# Patient Record
Sex: MALE | Race: OTHER | HISPANIC OR LATINO | Employment: PART TIME | URBAN - METROPOLITAN AREA
[De-identification: names, ages, dates, MRNs, and addresses within clinical notes are randomized per-mention and may not be internally consistent; named-entity substitution may affect disease eponyms.]

---

## 2019-12-30 ENCOUNTER — APPOINTMENT (OUTPATIENT)
Dept: LAB | Age: 34
End: 2019-12-30

## 2019-12-30 ENCOUNTER — TRANSCRIBE ORDERS (OUTPATIENT)
Dept: URGENT CARE | Facility: MEDICAL CENTER | Age: 34
End: 2019-12-30

## 2019-12-30 ENCOUNTER — APPOINTMENT (OUTPATIENT)
Dept: URGENT CARE | Age: 34
End: 2019-12-30

## 2019-12-30 DIAGNOSIS — Z02.1 PRE-EMPLOYMENT EXAMINATION: Primary | ICD-10-CM

## 2019-12-30 DIAGNOSIS — Z02.1 PRE-EMPLOYMENT EXAMINATION: ICD-10-CM

## 2019-12-30 LAB — RUBV IGG SERPL IA-ACNC: 137.8 IU/ML

## 2019-12-30 PROCEDURE — 86765 RUBEOLA ANTIBODY: CPT

## 2019-12-30 PROCEDURE — 86735 MUMPS ANTIBODY: CPT

## 2019-12-30 PROCEDURE — 36415 COLL VENOUS BLD VENIPUNCTURE: CPT

## 2019-12-30 PROCEDURE — 86480 TB TEST CELL IMMUN MEASURE: CPT

## 2019-12-30 PROCEDURE — 86762 RUBELLA ANTIBODY: CPT

## 2019-12-31 LAB
GAMMA INTERFERON BACKGROUND BLD IA-ACNC: 0.04 IU/ML
M TB IFN-G BLD-IMP: NEGATIVE
M TB IFN-G CD4+ BCKGRND COR BLD-ACNC: 0 IU/ML
M TB IFN-G CD4+ BCKGRND COR BLD-ACNC: 0.01 IU/ML
MEV IGG SER QL: NORMAL
MITOGEN IGNF BCKGRD COR BLD-ACNC: >10 IU/ML
MUV IGG SER QL: NORMAL

## 2020-10-22 PROBLEM — L29.3: Status: ACTIVE | Noted: 2020-10-22

## 2020-12-22 ENCOUNTER — IMMUNIZATIONS (OUTPATIENT)
Dept: FAMILY MEDICINE CLINIC | Facility: HOSPITAL | Age: 35
End: 2020-12-22

## 2020-12-22 DIAGNOSIS — Z23 ENCOUNTER FOR IMMUNIZATION: ICD-10-CM

## 2020-12-22 PROCEDURE — 0001A SARS-COV-2 / COVID-19 MRNA VACCINE (PFIZER-BIONTECH) 30 MCG: CPT

## 2020-12-22 PROCEDURE — 91300 SARS-COV-2 / COVID-19 MRNA VACCINE (PFIZER-BIONTECH) 30 MCG: CPT

## 2021-01-11 ENCOUNTER — IMMUNIZATIONS (OUTPATIENT)
Dept: FAMILY MEDICINE CLINIC | Facility: HOSPITAL | Age: 36
End: 2021-01-11

## 2021-01-11 DIAGNOSIS — Z23 ENCOUNTER FOR IMMUNIZATION: ICD-10-CM

## 2021-01-11 PROCEDURE — 0002A SARS-COV-2 / COVID-19 MRNA VACCINE (PFIZER-BIONTECH) 30 MCG: CPT

## 2021-01-11 PROCEDURE — 91300 SARS-COV-2 / COVID-19 MRNA VACCINE (PFIZER-BIONTECH) 30 MCG: CPT

## 2021-09-14 ENCOUNTER — TELEPHONE (OUTPATIENT)
Dept: PSYCHIATRY | Facility: CLINIC | Age: 36
End: 2021-09-14

## 2021-09-14 NOTE — TELEPHONE ENCOUNTER
Behavorial Health Outpatient Intake Questions    Referred by: Internal Referral    Please advised interviewee that they need to answer all questions truthfully to allow for best care and any misrepresentations of information may affect their ability to be seen at this clinic   => Was this discussed? Yes     Behavorial Health Outpatient Intake History -     Presenting Problem (in patient's words): Anxiety and Panic Attacks     Are there any developmental disabilities? ? If yes, can they speak to you on the phone? If they are too limited to speak to you on phone, refer out No    Are you taking any psychiatric medications? No    => If yes, who prescribes? If yes, are they injectable medications? Does the patient have a language barrier or hearing impairment? No    Have you been treated at [facility] by a therapist or a doctor in the past? If yes, who? No    Has the patient been hospitalized for mental health? No   If yes, how long ago was last hospitalization and where was it? Do you actively use alcohol or marijuana or illegal substances? If yes, what and how much - refer out to Drug and alcohol treatment if use is excessive or daily use of illegal substances No concerns of substance abuse are reported  Do you have a community treatment team or ? No    Legal History-     Does the patient have any history of arrests, MCC/snf time, or DUIs? No  If Yes-  1) What types of charges? 2) When were they last incarcerated? 3) Are they currently on parole or probation? Minor Child-    Who has custody of the child? Is there a custody agreement? If there is a custody agreement remind parent that they must bring a copy to the first appt or they will not be seen       Intake Team, please check with provider before scheduling if flags come up such as:  - complex case  - legal history (other than DUI)  - communication barrier concerns are present  - if, in your judgment, this needs further review    ACCEPTED as a patient Yes  => Appointment Date: 9/17 at 3:30 with Dr SCHULTZ    Referred Elsewhere? No    Name of Insurance Co: [employer] ID# [id number]  [facility]  If ins is primary or secondary  If patient is a minor, parents information such as Name, D  O B of guarantor

## 2021-09-15 ENCOUNTER — APPOINTMENT (OUTPATIENT)
Dept: LAB | Facility: HOSPITAL | Age: 36
End: 2021-09-15

## 2021-09-15 DIAGNOSIS — Z00.8 ENCOUNTER FOR OTHER GENERAL EXAMINATION: ICD-10-CM

## 2021-09-15 LAB
CHOLEST SERPL-MCNC: 154 MG/DL
EST. AVERAGE GLUCOSE BLD GHB EST-MCNC: 88 MG/DL
HBA1C MFR BLD: 4.7 %
HDLC SERPL-MCNC: 49 MG/DL
LDLC SERPL CALC-MCNC: 92 MG/DL
NONHDLC SERPL-MCNC: 105 MG/DL
TRIGL SERPL-MCNC: 65 MG/DL

## 2021-09-15 PROCEDURE — 83036 HEMOGLOBIN GLYCOSYLATED A1C: CPT

## 2021-09-15 PROCEDURE — 84443 ASSAY THYROID STIM HORMONE: CPT | Performed by: PSYCHIATRY & NEUROLOGY

## 2021-09-15 PROCEDURE — 80061 LIPID PANEL: CPT

## 2021-09-15 PROCEDURE — 36415 COLL VENOUS BLD VENIPUNCTURE: CPT

## 2021-09-17 ENCOUNTER — OFFICE VISIT (OUTPATIENT)
Dept: PSYCHIATRY | Facility: CLINIC | Age: 36
End: 2021-09-17
Payer: COMMERCIAL

## 2021-09-17 DIAGNOSIS — F40.01 PANIC DISORDER WITH AGORAPHOBIA AND MODERATE PANIC ATTACKS: Primary | ICD-10-CM

## 2021-09-17 DIAGNOSIS — F41.9 ANXIETY: ICD-10-CM

## 2021-09-17 LAB — TSH SERPL DL<=0.05 MIU/L-ACNC: 1.6 UIU/ML (ref 0.47–4.68)

## 2021-09-17 PROCEDURE — 90792 PSYCH DIAG EVAL W/MED SRVCS: CPT | Performed by: PSYCHIATRY & NEUROLOGY

## 2021-09-17 RX ORDER — SERTRALINE HYDROCHLORIDE 25 MG/1
25 TABLET, FILM COATED ORAL DAILY
Qty: 30 TABLET | Refills: 0 | Status: SHIPPED | OUTPATIENT
Start: 2021-09-17 | End: 2021-10-08

## 2021-09-17 RX ORDER — HYDROXYZINE HYDROCHLORIDE 10 MG/1
10 TABLET, FILM COATED ORAL 2 TIMES DAILY PRN
Qty: 30 TABLET | Refills: 0 | Status: SHIPPED | OUTPATIENT
Start: 2021-09-17 | End: 2021-10-08 | Stop reason: SINTOL

## 2021-09-18 NOTE — BH TREATMENT PLAN
Treatment Plan done but not signed at time of office visit due to:  Plan reviewed by phone or in person  and verbal consent given due to [address] distancing  TREATMENT PLAN (Medication Management Only)        [facility]    Name and Date of Birth:  Sherman Bradley 39 y o  1985  Date of Treatment Plan: September 17, 2021  Diagnosis/Diagnoses:    1  Panic disorder with agoraphobia and moderate panic attacks    2  Anxiety      Strengths/Personal Resources for Self-Care: supportive family, supportive friends, ability to adapt to life changes, ability to communicate needs, ability to communicate well, ability to listen, ability to reason, average or above intelligence  Area/Areas of need (in own words): anxiety symptoms  1  Long Term Goal: alleviate anxiety, continue improvement in acceptable anxiety level  Target Date: 6 months - 3/17/2022  Person/Persons responsible for completion of goal:  self  2  Short Term Objective (s) - How will we reach this goal?:   A  Provider new recommended medication/dosage changes and/or continue medication(s): continue current medications as prescribed  B   N/A  Target Date: 6 months - 3/17/2022  Person/Persons Responsible for Completion of Goal:  self  Progress Towards Goals: starting treatment  Treatment Modality: medication management every 1 month  Review due 6 months from date of this plan: 6 months - 3/17/2022  Expected length of service: ongoing treatment unless revised  My Physician/PA/NP and I have developed this plan together and I agree to work on the goals and objectives  I understand the treatment goals that were developed for my treatment

## 2021-09-18 NOTE — PSYCH
Psychiatric Evaluation - Behavioral Health     Identification Data:[place] 39 y o  male MRN: [id number]  Unit/Bed#:  Encounter: [id number]    Chief Complaint:  The panic disorder that lead to significant disruption patient's life  History of Present Illness     Glo Stringer is a 39 y o  male with a history of anxiety and Panic Disorder who  was evaluated for medication management and therapy to address his severe recurrent panic disorder that started this February and after a brief remission during summer months became more and more significantly disabling because the patient was unable to drive on highways and even on local roads, ad had to make frequent stops because of overwhelming anxiety panic fear of crashing his car  Multiple factors including family stressors, and job related stressors associated with COVID-19 infection lead to severe stress, and developing anxiety and panic disorder  Patient denied history of bipolar symptoms, denies suicidal thoughts, denied any history of use of abusing substances or misuse of alcohol      Psychiatric Review Of Systems:    sleep changes: no  appetite changes: no  energy/anergy: decreased  anxiety/panic: yes  paulina: no  self injurious behavior/risky behavior: no  Suicidal ideation: no  Homicidal ideation: no  Auditory hallucinations: no  Visual hallucinations: no  Delusional thinking: no      Historical Information     Past Psychiatric History:     Past Inpatient Psychiatric Treatment:   No history of past inpatient psychiatric admissions  Past Outpatient Psychiatric Treatment:    Has never seen a psychiatrist prior to admission  Past Suicide Attempts:    no  Past Psychiatric Medication Trials:    none     Substance Abuse History:  Social History     Tobacco History     Smoking Status  Never Assessed    Smokeless Tobacco Use  Unknown          Alcohol History     Alcohol Use Status  Not Asked          Drug Use     Drug Use Status  Not Asked Sexual Activity     Sexually Active  Not Asked          Activities of Daily Living    Not Asked                 I have assessed this patient for substance use within the past 12 months    History of Inpatient/Outpatient rehabilitation program: no  Smoking history: none    Family Psychiatric History:     Psychiatric Illness:  Son -  possibly anxiety disorder and taking Zoloft  Substance Abuse:  patient denies  Suicide Attempts:  patient denies    Social History:    Education: college graduate  Marital History:   Occupational History: works  full-time at [employer]    Past Medical History:    History of Seizures: no    No past medical history on file  No past surgical history on file  Medical Review Of Systems:    EFO Review Of Systems: A comprehensive review of systems was negative  Allergies:    No Known Allergies    Medications: All current active medications have been reviewed      Objective     Vital signs in last 24 hours:    [unfilled]    [unfilled]     Mental Status Evaluation:      Appearance:  dressed appropriately, casually dressed   Behavior:  normal, cooperative, calm   Mood:  anxious   Affect: constricted    Speech:  normal rate and volume   Language: appropriate   Thought Process:  normal   Associations: concrete associations   Thought Content:  normal   Perceptual Disturbances: no auditory hallucinations, no visual hallucinations, denies auditory hallucinations when asked, does not appear responding to internal stimuli   Risk Potential: Suicidal ideation - None  Homicidal ideation - None  Potential for aggression - No   Sensorium:  oriented to person, place and time   Memory:  recent and remote memory grossly intact   Consciousness:  alert and awake   Attention: attention span and concentration are normal   Fund of Knowledge: awareness of current events appropriate   Insight:  normal   Judgment: normal   Muscle Tone: normal   Gait/Station: normal gait/station and normal balance Motor Activity: no abnormal movements               Laboratory Results:   I have personally reviewed all pertinent laboratory/tests results  Most Recent Labs:   Lab Results   Component Value Date    CHOLESTEROL 154 09/15/2021    HDL 49 09/15/2021    TRIG 65 09/15/2021    LDLCALC 92 09/15/2021    NONHDLC 105 09/15/2021    CRD1YATVTYKK 1 600 09/15/2021    HGBA1C 4 7 09/15/2021    EAG 88 09/15/2021       Imaging Studies: No results found  Code Status: [unfilled]    Assessment/Plan   Active Problems:    * No active hospital problems  *      Treatment Plan:     Planned Treatment and Medication Changes: All current active medications have been reviewed   Decision was made to start combination of low-dose of Atarax for now to help the patient with acute symptoms of anxiety and panic attacks starting with 5-10 mg twice a day and low-dose of Zoloft 12 5 mg with increase to 25 to address longer term treatment of panic disorder with agoraphobia and specific fear of driving  CBT based therapy was provided and the patient was provided with material for self therapy  Will also request therapeutic approach from our sent look therapist to help the patient with cognitive aspects of his panic disorder  We discussed potential side effects and benefits of his medications the patient expressed his understanding     to rule to rule out possible medical reasons contributing to panic attack, for rate function test was ordered  The patient was advised to with his PCP for physical exam and explore cardiac condition which sometimes may also lead to panic  attacks, however unlikely according to the patient presentation  Risks / Benefits of Treatment:    Risks, benefits, and possible side effects of medications explained to patient and patient verbalizes understanding and agreement for treatment  ** Please Note: This note has been constructed using a voice recognition system   **

## 2021-10-08 ENCOUNTER — OFFICE VISIT (OUTPATIENT)
Dept: PSYCHIATRY | Facility: CLINIC | Age: 36
End: 2021-10-08
Payer: COMMERCIAL

## 2021-10-08 DIAGNOSIS — F41.9 ANXIETY: ICD-10-CM

## 2021-10-08 DIAGNOSIS — F40.01 PANIC DISORDER WITH AGORAPHOBIA: ICD-10-CM

## 2021-10-08 DIAGNOSIS — F40.01 PANIC DISORDER WITH AGORAPHOBIA AND MODERATE PANIC ATTACKS: Primary | ICD-10-CM

## 2021-10-08 PROCEDURE — 99214 OFFICE O/P EST MOD 30 MIN: CPT | Performed by: PSYCHIATRY & NEUROLOGY

## 2021-10-08 RX ORDER — PAROXETINE 10 MG/1
10 TABLET, FILM COATED ORAL
Qty: 30 TABLET | Refills: 1 | Status: SHIPPED | OUTPATIENT
Start: 2021-10-08 | End: 2021-11-07

## 2021-10-08 RX ORDER — PROPRANOLOL HYDROCHLORIDE 10 MG/1
10 TABLET ORAL 2 TIMES DAILY PRN
Qty: 60 TABLET | Refills: 1 | Status: SHIPPED | OUTPATIENT
Start: 2021-10-08 | End: 2021-11-07

## 2022-04-26 ENCOUNTER — TELEPHONE (OUTPATIENT)
Dept: PSYCHIATRY | Facility: CLINIC | Age: 37
End: 2022-04-26

## 2022-04-26 NOTE — TELEPHONE ENCOUNTER
Spoke with patient regarding wait list and scheduling  Patient stated that he is no longer interested in talk therapy services   Patient has been removed from wait list